# Patient Record
Sex: MALE | Race: WHITE
[De-identification: names, ages, dates, MRNs, and addresses within clinical notes are randomized per-mention and may not be internally consistent; named-entity substitution may affect disease eponyms.]

---

## 2024-05-30 ENCOUNTER — HOSPITAL ENCOUNTER (INPATIENT)
Dept: HOSPITAL 99 - 2 SOUTH | Age: 79
LOS: 3 days | Discharge: HOME | DRG: 853 | End: 2024-06-02
Payer: COMMERCIAL

## 2024-05-30 VITALS — SYSTOLIC BLOOD PRESSURE: 157 MMHG | DIASTOLIC BLOOD PRESSURE: 88 MMHG | RESPIRATION RATE: 14 BRPM

## 2024-05-30 VITALS — RESPIRATION RATE: 25 BRPM

## 2024-05-30 VITALS — RESPIRATION RATE: 34 BRPM

## 2024-05-30 VITALS — RESPIRATION RATE: 27 BRPM

## 2024-05-30 VITALS — RESPIRATION RATE: 19 BRPM

## 2024-05-30 VITALS — RESPIRATION RATE: 24 BRPM | DIASTOLIC BLOOD PRESSURE: 92 MMHG | SYSTOLIC BLOOD PRESSURE: 148 MMHG

## 2024-05-30 VITALS — RESPIRATION RATE: 23 BRPM

## 2024-05-30 VITALS — SYSTOLIC BLOOD PRESSURE: 104 MMHG | DIASTOLIC BLOOD PRESSURE: 62 MMHG | RESPIRATION RATE: 15 BRPM

## 2024-05-30 VITALS — RESPIRATION RATE: 18 BRPM | DIASTOLIC BLOOD PRESSURE: 107 MMHG | SYSTOLIC BLOOD PRESSURE: 171 MMHG

## 2024-05-30 VITALS — RESPIRATION RATE: 21 BRPM

## 2024-05-30 VITALS — SYSTOLIC BLOOD PRESSURE: 147 MMHG | DIASTOLIC BLOOD PRESSURE: 90 MMHG | RESPIRATION RATE: 24 BRPM

## 2024-05-30 VITALS — RESPIRATION RATE: 20 BRPM

## 2024-05-30 VITALS — RESPIRATION RATE: 23 BRPM | SYSTOLIC BLOOD PRESSURE: 125 MMHG | DIASTOLIC BLOOD PRESSURE: 71 MMHG

## 2024-05-30 VITALS — RESPIRATION RATE: 16 BRPM

## 2024-05-30 VITALS — RESPIRATION RATE: 22 BRPM

## 2024-05-30 VITALS — SYSTOLIC BLOOD PRESSURE: 113 MMHG | DIASTOLIC BLOOD PRESSURE: 77 MMHG | RESPIRATION RATE: 20 BRPM

## 2024-05-30 VITALS — RESPIRATION RATE: 28 BRPM

## 2024-05-30 VITALS — BODY MASS INDEX: 27.3 KG/M2 | BODY MASS INDEX: 25.8 KG/M2

## 2024-05-30 VITALS — RESPIRATION RATE: 13 BRPM

## 2024-05-30 DIAGNOSIS — Z95.818: ICD-10-CM

## 2024-05-30 DIAGNOSIS — Z86.73: ICD-10-CM

## 2024-05-30 DIAGNOSIS — I48.0: ICD-10-CM

## 2024-05-30 DIAGNOSIS — S09.90XA: ICD-10-CM

## 2024-05-30 DIAGNOSIS — Z66: ICD-10-CM

## 2024-05-30 DIAGNOSIS — Z96.652: ICD-10-CM

## 2024-05-30 DIAGNOSIS — K56.7: ICD-10-CM

## 2024-05-30 DIAGNOSIS — E87.1: ICD-10-CM

## 2024-05-30 DIAGNOSIS — E87.6: ICD-10-CM

## 2024-05-30 DIAGNOSIS — Z79.899: ICD-10-CM

## 2024-05-30 DIAGNOSIS — E86.0: ICD-10-CM

## 2024-05-30 DIAGNOSIS — A41.9: Primary | ICD-10-CM

## 2024-05-30 DIAGNOSIS — Z87.891: ICD-10-CM
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
## 2024-05-30 DIAGNOSIS — R74.01: ICD-10-CM
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
## 2024-05-30 DIAGNOSIS — R18.8: ICD-10-CM

## 2024-05-30 DIAGNOSIS — K80.00: ICD-10-CM

## 2024-05-30 DIAGNOSIS — E78.00: ICD-10-CM
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
## 2024-05-30 DIAGNOSIS — E78.5: ICD-10-CM

## 2024-05-30 DIAGNOSIS — I71.02: ICD-10-CM

## 2024-05-30 DIAGNOSIS — Z79.82: ICD-10-CM

## 2024-05-30 DIAGNOSIS — K59.00: ICD-10-CM

## 2024-05-30 DIAGNOSIS — I10: ICD-10-CM

## 2024-05-30 DIAGNOSIS — I73.9: ICD-10-CM

## 2024-05-30 DIAGNOSIS — D49.0: ICD-10-CM

## 2024-05-30 LAB
ALBUMIN SERPL-MCNC: 4.3 G/DL (ref 3.5–5)
ALP SERPL-CCNC: 164 U/L (ref 38–126)
ALT SERPL-CCNC: 23 U/L (ref 0–50)
AST SERPL-CCNC: 31 U/L (ref 17–59)
BUN SERPL-MCNC: 22 MG/DL (ref 9–20)
CALCIUM SERPL-MCNC: 9.8 MG/DL (ref 8.4–10.2)
CHLORIDE SERPL-SCNC: 94 MMOL/L (ref 98–107)
CO2 SERPL-SCNC: 25 MMOL/L (ref 22–30)
EGFR: > 60
ERYTHROCYTE [DISTWIDTH] IN BLOOD BY AUTOMATED COUNT: 15.5 % (ref 11.5–14.5)
ESTIMATED CREATININE CLEARANCE: 62 ML/MIN
GLUCOSE SERPL-MCNC: 103 MG/DL (ref 70–99)
HCT VFR BLD AUTO: 44.5 % (ref 39–52)
HGB BLD-MCNC: 15.2 G/DL (ref 13–18)
LIPASE SERPL-CCNC: 40 U/L (ref 23–300)
MCHC RBC AUTO-ENTMCNC: 34.2 G/DL (ref 33–37)
MCV RBC AUTO: 88.3 FL (ref 80–94)
NRBC BLD AUTO-RTO: 0 %
PLATELET # BLD AUTO: 240 10^3/UL (ref 130–400)
POTASSIUM SERPL-SCNC: 3.2 MMOL/L (ref 3.5–5.1)
PROT SERPL-MCNC: 7 G/DL (ref 6.3–8.2)
SODIUM SERPL-SCNC: 132 MMOL/L (ref 135–145)
TROPONIN I SERPL-MCNC: < 0.012 NG/ML

## 2024-05-30 PROCEDURE — 88304 TISSUE EXAM BY PATHOLOGIST: CPT

## 2024-05-30 PROCEDURE — A9575 INJ GADOTERATE MEGLUMI 0.1ML: HCPCS

## 2024-05-30 RX ADMIN — MAGNESIUM SULFATE HEPTAHYDRATE 50: 2 INJECTION, SOLUTION INTRAVENOUS at 19:31

## 2024-05-30 RX ADMIN — TAZOBACTAM SODIUM AND PIPERACILLIN SODIUM 50: 375; 3 INJECTION, SOLUTION INTRAVENOUS at 22:48

## 2024-05-30 RX ADMIN — SODIUM CHLORIDE 1000: 900 INJECTION, SOLUTION INTRAVENOUS at 19:30

## 2024-05-30 RX ADMIN — POTASSIUM CHLORIDE 40 MEQ: 1500 TABLET, EXTENDED RELEASE ORAL at 19:30

## 2024-05-31 VITALS — DIASTOLIC BLOOD PRESSURE: 98 MMHG | SYSTOLIC BLOOD PRESSURE: 143 MMHG | RESPIRATION RATE: 16 BRPM

## 2024-05-31 VITALS — RESPIRATION RATE: 16 BRPM | DIASTOLIC BLOOD PRESSURE: 84 MMHG | SYSTOLIC BLOOD PRESSURE: 145 MMHG

## 2024-05-31 VITALS
RESPIRATION RATE: 20 BRPM | OXYGEN SATURATION: 2 % | SYSTOLIC BLOOD PRESSURE: 164 MMHG | DIASTOLIC BLOOD PRESSURE: 98 MMHG

## 2024-05-31 VITALS — SYSTOLIC BLOOD PRESSURE: 159 MMHG | DIASTOLIC BLOOD PRESSURE: 106 MMHG | RESPIRATION RATE: 15 BRPM

## 2024-05-31 VITALS — RESPIRATION RATE: 18 BRPM | SYSTOLIC BLOOD PRESSURE: 145 MMHG | DIASTOLIC BLOOD PRESSURE: 94 MMHG

## 2024-05-31 VITALS — OXYGEN SATURATION: 2 %

## 2024-05-31 VITALS — RESPIRATION RATE: 16 BRPM | DIASTOLIC BLOOD PRESSURE: 80 MMHG | SYSTOLIC BLOOD PRESSURE: 130 MMHG

## 2024-05-31 VITALS — SYSTOLIC BLOOD PRESSURE: 131 MMHG | RESPIRATION RATE: 10 BRPM | DIASTOLIC BLOOD PRESSURE: 89 MMHG

## 2024-05-31 VITALS — RESPIRATION RATE: 16 BRPM

## 2024-05-31 VITALS — SYSTOLIC BLOOD PRESSURE: 136 MMHG | DIASTOLIC BLOOD PRESSURE: 99 MMHG | RESPIRATION RATE: 18 BRPM

## 2024-05-31 VITALS — SYSTOLIC BLOOD PRESSURE: 150 MMHG | DIASTOLIC BLOOD PRESSURE: 97 MMHG | RESPIRATION RATE: 20 BRPM

## 2024-05-31 VITALS — RESPIRATION RATE: 17 BRPM | SYSTOLIC BLOOD PRESSURE: 162 MMHG | DIASTOLIC BLOOD PRESSURE: 102 MMHG

## 2024-05-31 VITALS — SYSTOLIC BLOOD PRESSURE: 133 MMHG | RESPIRATION RATE: 18 BRPM | DIASTOLIC BLOOD PRESSURE: 80 MMHG

## 2024-05-31 VITALS — SYSTOLIC BLOOD PRESSURE: 35 MMHG | OXYGEN SATURATION: 2 %

## 2024-05-31 VITALS — DIASTOLIC BLOOD PRESSURE: 88 MMHG | RESPIRATION RATE: 16 BRPM | SYSTOLIC BLOOD PRESSURE: 144 MMHG

## 2024-05-31 VITALS — RESPIRATION RATE: 15 BRPM | SYSTOLIC BLOOD PRESSURE: 136 MMHG | DIASTOLIC BLOOD PRESSURE: 94 MMHG

## 2024-05-31 VITALS — RESPIRATION RATE: 16 BRPM | SYSTOLIC BLOOD PRESSURE: 139 MMHG | DIASTOLIC BLOOD PRESSURE: 85 MMHG

## 2024-05-31 VITALS — SYSTOLIC BLOOD PRESSURE: 141 MMHG | RESPIRATION RATE: 16 BRPM | DIASTOLIC BLOOD PRESSURE: 79 MMHG

## 2024-05-31 VITALS — RESPIRATION RATE: 19 BRPM

## 2024-05-31 VITALS — OXYGEN SATURATION: 2 % | DIASTOLIC BLOOD PRESSURE: 94 MMHG

## 2024-05-31 VITALS — SYSTOLIC BLOOD PRESSURE: 139 MMHG | DIASTOLIC BLOOD PRESSURE: 93 MMHG

## 2024-05-31 VITALS — DIASTOLIC BLOOD PRESSURE: 87 MMHG | RESPIRATION RATE: 16 BRPM | SYSTOLIC BLOOD PRESSURE: 123 MMHG

## 2024-05-31 LAB
ALBUMIN SERPL-MCNC: 3.4 G/DL (ref 3.5–5)
ALP SERPL-CCNC: 155 U/L (ref 38–126)
ALT SERPL-CCNC: 18 U/L (ref 0–50)
AST SERPL-CCNC: 27 U/L (ref 17–59)
BUN SERPL-MCNC: 22 MG/DL (ref 9–20)
CALCIUM SERPL-MCNC: 8.8 MG/DL (ref 8.4–10.2)
CHLORIDE SERPL-SCNC: 99 MMOL/L (ref 98–107)
CO2 SERPL-SCNC: 30 MMOL/L (ref 22–30)
EGFR: > 60
ERYTHROCYTE [DISTWIDTH] IN BLOOD BY AUTOMATED COUNT: 15.8 % (ref 11.5–14.5)
ESTIMATED CREATININE CLEARANCE: 62 ML/MIN
GLUCOSE SERPL-MCNC: 65 MG/DL (ref 70–99)
HCT VFR BLD AUTO: 40.1 % (ref 39–52)
HGB BLD-MCNC: 13.6 G/DL (ref 13–18)
MAGNESIUM SERPL-MCNC: 2.5 MG/DL (ref 1.6–2.3)
MCHC RBC AUTO-ENTMCNC: 33.9 G/DL (ref 33–37)
MCV RBC AUTO: 89.1 FL (ref 80–94)
PLATELET # BLD AUTO: 211 10^3/UL (ref 130–400)
POTASSIUM SERPL-SCNC: 3 MMOL/L (ref 3.5–5.1)
PROT SERPL-MCNC: 5.9 G/DL (ref 6.3–8.2)
SODIUM SERPL-SCNC: 136 MMOL/L (ref 135–145)
SQUAMOUS URNS QL MICRO: (no result) /LPF
URINE RED BLOOD CELL: (no result) /HPF (ref 0–2)
URINE WHITE CELL: (no result) /HPF (ref 0–5)

## 2024-05-31 PROCEDURE — BF502Z0 OTHER IMAGING OF BILE DUCTS USING FLUORESCING AGENT, INTRAOPERATIVE: ICD-10-PCS | Performed by: RADIOLOGY

## 2024-05-31 PROCEDURE — 0FT44ZZ RESECTION OF GALLBLADDER, PERCUTANEOUS ENDOSCOPIC APPROACH: ICD-10-PCS | Performed by: SURGERY

## 2024-05-31 RX ADMIN — ACETAMINOPHEN 650 MG: 325 TABLET ORAL at 01:24

## 2024-05-31 RX ADMIN — TAZOBACTAM SODIUM AND PIPERACILLIN SODIUM: 375; 3 INJECTION, SOLUTION INTRAVENOUS at 14:05

## 2024-05-31 RX ADMIN — HYDROMORPHONE HYDROCHLORIDE 0.5 MG: 1 INJECTION, SOLUTION INTRAMUSCULAR; INTRAVENOUS; SUBCUTANEOUS at 12:21

## 2024-05-31 RX ADMIN — METOPROLOL TARTRATE 50 MG: 50 TABLET, FILM COATED ORAL at 20:18

## 2024-05-31 RX ADMIN — TAZOBACTAM SODIUM AND PIPERACILLIN SODIUM 50: 375; 3 INJECTION, SOLUTION INTRAVENOUS at 15:57

## 2024-05-31 RX ADMIN — SODIUM BICARBONATE 270 MEQ: 84 INJECTION, SOLUTION INTRAVENOUS at 12:32

## 2024-05-31 RX ADMIN — ATORVASTATIN CALCIUM 80 MG: 80 TABLET, FILM COATED ORAL at 17:57

## 2024-05-31 RX ADMIN — TAZOBACTAM SODIUM AND PIPERACILLIN SODIUM 50: 375; 3 INJECTION, SOLUTION INTRAVENOUS at 04:22

## 2024-05-31 RX ADMIN — ASPIRIN 81 MG: 81 TABLET, COATED ORAL at 08:38

## 2024-05-31 RX ADMIN — SODIUM CHLORIDE 10 ML: 9 INJECTION, SOLUTION INTRAMUSCULAR; INTRAVENOUS; SUBCUTANEOUS at 14:10

## 2024-05-31 RX ADMIN — METOPROLOL TARTRATE 50 MG: 50 TABLET, FILM COATED ORAL at 08:38

## 2024-05-31 RX ADMIN — TAZOBACTAM SODIUM AND PIPERACILLIN SODIUM 50: 375; 3 INJECTION, SOLUTION INTRAVENOUS at 21:06

## 2024-05-31 RX ADMIN — PANTOPRAZOLE SODIUM 40 MG: 40 INJECTION, POWDER, LYOPHILIZED, FOR SOLUTION INTRAVENOUS at 14:10

## 2024-05-31 RX ADMIN — SODIUM CHLORIDE, SODIUM LACTATE, POTASSIUM CHLORIDE, AND CALCIUM CHLORIDE 1000: 600; 310; 30; 20 INJECTION, SOLUTION INTRAVENOUS at 01:22

## 2024-05-31 RX ADMIN — METOPROLOL TARTRATE 5 MG: 1 INJECTION, SOLUTION INTRAVENOUS at 12:33

## 2024-05-31 RX ADMIN — ENOXAPARIN SODIUM 40 MG: 40 INJECTION SUBCUTANEOUS at 17:57

## 2024-05-31 RX ADMIN — SODIUM CHLORIDE, SODIUM LACTATE, POTASSIUM CHLORIDE, AND CALCIUM CHLORIDE 1000: 600; 310; 30; 20 INJECTION, SOLUTION INTRAVENOUS at 13:11

## 2024-06-01 VITALS — SYSTOLIC BLOOD PRESSURE: 129 MMHG | RESPIRATION RATE: 18 BRPM | DIASTOLIC BLOOD PRESSURE: 86 MMHG

## 2024-06-01 VITALS — RESPIRATION RATE: 16 BRPM | SYSTOLIC BLOOD PRESSURE: 146 MMHG | DIASTOLIC BLOOD PRESSURE: 96 MMHG

## 2024-06-01 VITALS — DIASTOLIC BLOOD PRESSURE: 105 MMHG | RESPIRATION RATE: 16 BRPM | SYSTOLIC BLOOD PRESSURE: 145 MMHG

## 2024-06-01 VITALS — SYSTOLIC BLOOD PRESSURE: 149 MMHG | DIASTOLIC BLOOD PRESSURE: 96 MMHG | RESPIRATION RATE: 18 BRPM

## 2024-06-01 VITALS — SYSTOLIC BLOOD PRESSURE: 139 MMHG | DIASTOLIC BLOOD PRESSURE: 93 MMHG | RESPIRATION RATE: 18 BRPM

## 2024-06-01 VITALS — DIASTOLIC BLOOD PRESSURE: 95 MMHG | RESPIRATION RATE: 20 BRPM | SYSTOLIC BLOOD PRESSURE: 164 MMHG

## 2024-06-01 LAB
ALBUMIN SERPL-MCNC: 3.4 G/DL (ref 3.5–5)
ALP SERPL-CCNC: 298 U/L (ref 38–126)
ALT SERPL-CCNC: 70 U/L (ref 0–50)
AST SERPL-CCNC: 83 U/L (ref 17–59)
BUN SERPL-MCNC: 30 MG/DL (ref 9–20)
CALCIUM SERPL-MCNC: 9 MG/DL (ref 8.4–10.2)
CEA SERPL-MCNC: 0.6 NG/ML
CHLORIDE SERPL-SCNC: 100 MMOL/L (ref 98–107)
CO2 SERPL-SCNC: 27 MMOL/L (ref 22–30)
EGFR: > 60
ERYTHROCYTE [DISTWIDTH] IN BLOOD BY AUTOMATED COUNT: 15.9 % (ref 11.5–14.5)
ESTIMATED CREATININE CLEARANCE: 62 ML/MIN
GLUCOSE SERPL-MCNC: 121 MG/DL (ref 70–99)
HCT VFR BLD AUTO: 38.6 % (ref 39–52)
HGB BLD-MCNC: 13 G/DL (ref 13–18)
MCHC RBC AUTO-ENTMCNC: 33.7 G/DL (ref 33–37)
MCV RBC AUTO: 89.6 FL (ref 80–94)
PLATELET # BLD AUTO: 240 10^3/UL (ref 130–400)
POTASSIUM SERPL-SCNC: 3.7 MMOL/L (ref 3.5–5.1)
PROT SERPL-MCNC: 5.8 G/DL (ref 6.3–8.2)
SODIUM SERPL-SCNC: 136 MMOL/L (ref 135–145)

## 2024-06-01 RX ADMIN — METOPROLOL TARTRATE 50 MG: 50 TABLET, FILM COATED ORAL at 08:07

## 2024-06-01 RX ADMIN — TAZOBACTAM SODIUM AND PIPERACILLIN SODIUM 50: 375; 3 INJECTION, SOLUTION INTRAVENOUS at 21:24

## 2024-06-01 RX ADMIN — MINERAL OIL 133 ML: 100 ENEMA RECTAL at 15:43

## 2024-06-01 RX ADMIN — LISINOPRIL 20 MG: 20 TABLET ORAL at 20:26

## 2024-06-01 RX ADMIN — POLYETHYLENE GLYCOL 3350 17 GRAMS: 17 POWDER, FOR SOLUTION ORAL at 08:07

## 2024-06-01 RX ADMIN — ENOXAPARIN SODIUM 40 MG: 40 INJECTION SUBCUTANEOUS at 17:44

## 2024-06-01 RX ADMIN — TAZOBACTAM SODIUM AND PIPERACILLIN SODIUM 50: 375; 3 INJECTION, SOLUTION INTRAVENOUS at 17:44

## 2024-06-01 RX ADMIN — PANTOPRAZOLE SODIUM 40 MG: 40 INJECTION, POWDER, LYOPHILIZED, FOR SOLUTION INTRAVENOUS at 08:07

## 2024-06-01 RX ADMIN — LISINOPRIL 20 MG: 20 TABLET ORAL at 10:40

## 2024-06-01 RX ADMIN — TAZOBACTAM SODIUM AND PIPERACILLIN SODIUM 50: 375; 3 INJECTION, SOLUTION INTRAVENOUS at 10:40

## 2024-06-01 RX ADMIN — SENNOSIDES AND DOCUSATE SODIUM 1 TABLET: 8.6; 5 TABLET ORAL at 06:21

## 2024-06-01 RX ADMIN — TAZOBACTAM SODIUM AND PIPERACILLIN SODIUM 50: 375; 3 INJECTION, SOLUTION INTRAVENOUS at 03:38

## 2024-06-01 RX ADMIN — SODIUM CHLORIDE 10 ML: 9 INJECTION, SOLUTION INTRAMUSCULAR; INTRAVENOUS; SUBCUTANEOUS at 08:07

## 2024-06-01 RX ADMIN — METOPROLOL TARTRATE 50 MG: 50 TABLET, FILM COATED ORAL at 20:26

## 2024-06-01 RX ADMIN — ASPIRIN 81 MG: 81 TABLET, COATED ORAL at 08:07

## 2024-06-01 RX ADMIN — Medication 5 MG: at 22:42

## 2024-06-01 RX ADMIN — Medication 3 MG: at 01:56

## 2024-06-02 VITALS — DIASTOLIC BLOOD PRESSURE: 97 MMHG | SYSTOLIC BLOOD PRESSURE: 146 MMHG

## 2024-06-02 VITALS — SYSTOLIC BLOOD PRESSURE: 143 MMHG | RESPIRATION RATE: 16 BRPM | DIASTOLIC BLOOD PRESSURE: 93 MMHG

## 2024-06-02 VITALS — SYSTOLIC BLOOD PRESSURE: 144 MMHG | RESPIRATION RATE: 16 BRPM | DIASTOLIC BLOOD PRESSURE: 96 MMHG

## 2024-06-02 VITALS — RESPIRATION RATE: 16 BRPM | SYSTOLIC BLOOD PRESSURE: 144 MMHG | DIASTOLIC BLOOD PRESSURE: 96 MMHG

## 2024-06-02 VITALS — SYSTOLIC BLOOD PRESSURE: 132 MMHG | RESPIRATION RATE: 16 BRPM | DIASTOLIC BLOOD PRESSURE: 83 MMHG

## 2024-06-02 VITALS — RESPIRATION RATE: 14 BRPM | DIASTOLIC BLOOD PRESSURE: 89 MMHG | SYSTOLIC BLOOD PRESSURE: 137 MMHG

## 2024-06-02 LAB
ALBUMIN SERPL-MCNC: 3.2 G/DL (ref 3.5–5)
ALP SERPL-CCNC: 259 U/L (ref 38–126)
ALT SERPL-CCNC: 63 U/L (ref 0–50)
AST SERPL-CCNC: 68 U/L (ref 17–59)
BUN SERPL-MCNC: 31 MG/DL (ref 9–20)
CALCIUM SERPL-MCNC: 9 MG/DL (ref 8.4–10.2)
CANCER AG19-9 SERPL-ACNC: <2 U/ML (ref ?–35)
CHLORIDE SERPL-SCNC: 102 MMOL/L (ref 98–107)
CO2 SERPL-SCNC: 26 MMOL/L (ref 22–30)
EGFR: > 60
ESTIMATED CREATININE CLEARANCE: 69 ML/MIN
GLUCOSE SERPL-MCNC: 88 MG/DL (ref 70–99)
MAGNESIUM SERPL-MCNC: 2.4 MG/DL (ref 1.6–2.3)
POTASSIUM SERPL-SCNC: 3.2 MMOL/L (ref 3.5–5.1)
PROT SERPL-MCNC: 5.6 G/DL (ref 6.3–8.2)
SODIUM SERPL-SCNC: 137 MMOL/L (ref 135–145)

## 2024-06-02 RX ADMIN — MAGNESIUM HYDROXIDE 30 ML: 1200 LIQUID ORAL at 09:49

## 2024-06-02 RX ADMIN — POLYETHYLENE GLYCOL 3350 17 GRAMS: 17 POWDER, FOR SOLUTION ORAL at 08:35

## 2024-06-02 RX ADMIN — POTASSIUM CHLORIDE 40 MEQ: 1500 TABLET, EXTENDED RELEASE ORAL at 12:45

## 2024-06-02 RX ADMIN — SODIUM CHLORIDE 10 ML: 9 INJECTION, SOLUTION INTRAMUSCULAR; INTRAVENOUS; SUBCUTANEOUS at 08:35

## 2024-06-02 RX ADMIN — ASPIRIN 81 MG: 81 TABLET, COATED ORAL at 08:35

## 2024-06-02 RX ADMIN — POLYETHYLENE GLYCOL 3350 17 GRAMS: 17 POWDER, FOR SOLUTION ORAL at 09:49

## 2024-06-02 RX ADMIN — LISINOPRIL 20 MG: 20 TABLET ORAL at 08:34

## 2024-06-02 RX ADMIN — METOPROLOL TARTRATE 50 MG: 50 TABLET, FILM COATED ORAL at 08:34

## 2024-06-02 RX ADMIN — TAZOBACTAM SODIUM AND PIPERACILLIN SODIUM 50: 375; 3 INJECTION, SOLUTION INTRAVENOUS at 03:55

## 2024-06-02 RX ADMIN — TAZOBACTAM SODIUM AND PIPERACILLIN SODIUM 50: 375; 3 INJECTION, SOLUTION INTRAVENOUS at 09:48

## 2024-06-02 RX ADMIN — PANTOPRAZOLE SODIUM 40 MG: 40 INJECTION, POWDER, LYOPHILIZED, FOR SOLUTION INTRAVENOUS at 08:34

## 2024-06-02 RX ADMIN — BISACODYL 10 MG: 10 SUPPOSITORY RECTAL at 09:50

## 2024-06-07 ENCOUNTER — TELEPHONE (OUTPATIENT)
Age: 79
End: 2024-06-07

## 2024-06-10 ENCOUNTER — TELEPHONE (OUTPATIENT)
Dept: GASTROENTEROLOGY | Facility: MEDICAL CENTER | Age: 79
End: 2024-06-10

## 2024-06-10 NOTE — TELEPHONE ENCOUNTER
Left voicemail and requested call back     Called Patient to schedule new patient appointment with , but his scheduling goes far til Janaury 2025, if you would like to see a PA please let us know thank you        Message  Received: 3 days ago  [facility]  Caller: Unspecified (3 days ago,  4:09 PM)         Previous Messages    Returning call  (Newest Message First)  View All Conversations on this Encounter  Angie Waller routed conversation to [facility]3 days ago     Jahaira Brooks routed conversation to [facility]3 days ago     Jahaira Brooks3 days ago     [place]  Patients [facility] provider:  Dr. Liu     Number to return call: [phone]     Reason for call: Pt wife, Jessenia calling to let Dr. Liu know that they are able to get transportation to his office for appt/procedure. Pt wife would like a call back to set up a date and time and you may leave a message.     Scheduled procedure/appointment date if applicable: N/A            Note

## 2024-07-01 ENCOUNTER — HOSPITAL ENCOUNTER (EMERGENCY)
Dept: HOSPITAL 99 - EMR | Age: 79
Discharge: HOME | End: 2024-07-01
Payer: COMMERCIAL

## 2024-07-01 VITALS — SYSTOLIC BLOOD PRESSURE: 171 MMHG | RESPIRATION RATE: 16 BRPM | DIASTOLIC BLOOD PRESSURE: 108 MMHG

## 2024-07-01 VITALS — DIASTOLIC BLOOD PRESSURE: 117 MMHG | SYSTOLIC BLOOD PRESSURE: 178 MMHG

## 2024-07-01 VITALS — DIASTOLIC BLOOD PRESSURE: 116 MMHG | SYSTOLIC BLOOD PRESSURE: 177 MMHG

## 2024-07-01 VITALS — SYSTOLIC BLOOD PRESSURE: 172 MMHG | RESPIRATION RATE: 18 BRPM | DIASTOLIC BLOOD PRESSURE: 107 MMHG

## 2024-07-01 VITALS — SYSTOLIC BLOOD PRESSURE: 196 MMHG | DIASTOLIC BLOOD PRESSURE: 108 MMHG

## 2024-07-01 VITALS — BODY MASS INDEX: 27.7 KG/M2

## 2024-07-01 VITALS — RESPIRATION RATE: 18 BRPM

## 2024-07-01 DIAGNOSIS — I10: ICD-10-CM

## 2024-07-01 DIAGNOSIS — Z96.652: ICD-10-CM

## 2024-07-01 DIAGNOSIS — Z87.891: ICD-10-CM

## 2024-07-01 DIAGNOSIS — R30.0: Primary | ICD-10-CM

## 2024-07-01 DIAGNOSIS — R32: ICD-10-CM

## 2024-07-01 DIAGNOSIS — Z86.73: ICD-10-CM

## 2024-07-01 DIAGNOSIS — I48.91: ICD-10-CM

## 2024-07-01 DIAGNOSIS — R50.9: ICD-10-CM

## 2024-07-01 DIAGNOSIS — R35.0: ICD-10-CM

## 2024-07-01 DIAGNOSIS — E78.5: ICD-10-CM

## 2024-07-01 DIAGNOSIS — C80.1: ICD-10-CM

## 2024-07-01 DIAGNOSIS — Z90.49: ICD-10-CM

## 2024-07-01 DIAGNOSIS — Z98.890: ICD-10-CM

## 2024-07-01 LAB
ALBUMIN SERPL-MCNC: 4 G/DL (ref 3.5–5)
ALP SERPL-CCNC: 228 U/L (ref 38–126)
ALT SERPL-CCNC: 23 U/L (ref 0–50)
AST SERPL-CCNC: 34 U/L (ref 17–59)
BUN SERPL-MCNC: 15 MG/DL (ref 9–20)
CALCIUM SERPL-MCNC: 9.8 MG/DL (ref 8.4–10.2)
CHLORIDE SERPL-SCNC: 100 MMOL/L (ref 98–107)
CO2 SERPL-SCNC: 30 MMOL/L (ref 22–30)
EGFR: > 60
ERYTHROCYTE [DISTWIDTH] IN BLOOD BY AUTOMATED COUNT: 15.1 % (ref 11.5–14.5)
ESTIMATED CREATININE CLEARANCE: 77 ML/MIN
GLUCOSE SERPL-MCNC: 93 MG/DL (ref 70–99)
HCT VFR BLD AUTO: 37.9 % (ref 39–52)
HGB BLD-MCNC: 13.3 G/DL (ref 13–18)
MCHC RBC AUTO-ENTMCNC: 35.1 G/DL (ref 33–37)
MCV RBC AUTO: 86.7 FL (ref 80–94)
NRBC BLD AUTO-RTO: 0 %
PLATELET # BLD AUTO: 189 10^3/UL (ref 130–400)
POTASSIUM SERPL-SCNC: 3.2 MMOL/L (ref 3.5–5.1)
PROT SERPL-MCNC: 6.4 G/DL (ref 6.3–8.2)
SODIUM SERPL-SCNC: 139 MMOL/L (ref 135–145)
URINE RED BLOOD CELL: (no result) /HPF (ref 0–2)
URINE WHITE CELL: >100 /HPF (ref 0–5)

## 2024-07-01 PROCEDURE — 99284 EMERGENCY DEPT VISIT MOD MDM: CPT

## 2024-07-01 PROCEDURE — 51798 US URINE CAPACITY MEASURE: CPT

## 2024-07-01 PROCEDURE — 96374 THER/PROPH/DIAG INJ IV PUSH: CPT

## 2024-07-01 RX ADMIN — SODIUM CHLORIDE 1000: 900 INJECTION, SOLUTION INTRAVENOUS at 11:10

## 2024-07-01 RX ADMIN — ACETAMINOPHEN 1000 MG: 500 TABLET ORAL at 11:10

## 2024-07-01 RX ADMIN — CEFTRIAXONE 1000 MG: 1 INJECTION, POWDER, FOR SOLUTION INTRAMUSCULAR; INTRAVENOUS at 12:40

## 2024-07-01 RX ADMIN — POTASSIUM CHLORIDE 40 MEQ: 1500 TABLET, EXTENDED RELEASE ORAL at 12:41

## 2024-07-05 ENCOUNTER — HOSPITAL ENCOUNTER (OUTPATIENT)
Dept: HOSPITAL 99 - RAD | Age: 79
End: 2024-07-05
Payer: COMMERCIAL

## 2024-07-05 DIAGNOSIS — I73.9: Primary | ICD-10-CM

## 2025-06-02 ENCOUNTER — HOSPITAL ENCOUNTER (EMERGENCY)
Dept: HOSPITAL 99 - EMR | Age: 80
Discharge: HOME | End: 2025-06-02
Payer: COMMERCIAL

## 2025-06-02 VITALS — DIASTOLIC BLOOD PRESSURE: 86 MMHG | SYSTOLIC BLOOD PRESSURE: 144 MMHG

## 2025-06-02 VITALS — BODY MASS INDEX: 28 KG/M2

## 2025-06-02 VITALS — SYSTOLIC BLOOD PRESSURE: 147 MMHG | DIASTOLIC BLOOD PRESSURE: 86 MMHG

## 2025-06-02 VITALS — DIASTOLIC BLOOD PRESSURE: 82 MMHG | SYSTOLIC BLOOD PRESSURE: 118 MMHG

## 2025-06-02 VITALS — SYSTOLIC BLOOD PRESSURE: 135 MMHG | DIASTOLIC BLOOD PRESSURE: 93 MMHG

## 2025-06-02 VITALS — DIASTOLIC BLOOD PRESSURE: 92 MMHG | SYSTOLIC BLOOD PRESSURE: 131 MMHG

## 2025-06-02 VITALS — DIASTOLIC BLOOD PRESSURE: 93 MMHG | SYSTOLIC BLOOD PRESSURE: 146 MMHG

## 2025-06-02 DIAGNOSIS — R50.9: ICD-10-CM

## 2025-06-02 DIAGNOSIS — J44.9: ICD-10-CM

## 2025-06-02 DIAGNOSIS — R53.1: ICD-10-CM

## 2025-06-02 DIAGNOSIS — I48.91: ICD-10-CM

## 2025-06-02 DIAGNOSIS — I89.0: ICD-10-CM

## 2025-06-02 DIAGNOSIS — R05.9: Primary | ICD-10-CM

## 2025-06-02 DIAGNOSIS — Z90.49: ICD-10-CM

## 2025-06-02 DIAGNOSIS — I10: ICD-10-CM

## 2025-06-02 LAB
ALBUMIN SERPL-MCNC: 4.8 G/DL (ref 3.5–5)
ALP SERPL-CCNC: 148 U/L (ref 38–126)
ALT SERPL-CCNC: 24 U/L (ref 0–50)
AST SERPL-CCNC: 26 U/L (ref 17–59)
BASOPHILS # BLD AUTO: 0 10^3/UL (ref 0–0.2)
BASOPHILS NFR BLD AUTO: 0.4 % (ref 0–2)
BILIRUB SERPL-MCNC: 2.7 MG/DL (ref 0.2–1.3)
BUN SERPL-MCNC: 23 MG/DL (ref 9–20)
CALCIUM SERPL-MCNC: 9.5 MG/DL (ref 8.4–10.2)
CHLORIDE SERPL-SCNC: 109 MMOL/L (ref 98–107)
CO2 SERPL-SCNC: 22 MMOL/L (ref 22–30)
COMMENT: (no result)
CREAT SERPL-MCNC: 1 MG/DL (ref 0.7–1.3)
EGFR: > 60
EOSINOPHIL # BLD AUTO: 0 10^3/UL (ref 0–0.7)
EOSINOPHIL NFR BLD AUTO: 0.2 % (ref 0–6)
ERYTHROCYTE [DISTWIDTH] IN BLOOD BY AUTOMATED COUNT: 14 % (ref 11.5–14.5)
ESTIMATED CREATININE CLEARANCE: 61 ML/MIN
GLUCOSE SERPL-MCNC: 96 MG/DL (ref 70–99)
HCT VFR BLD AUTO: 35 % (ref 39–52)
HGB BLD-MCNC: 12.1 G/DL (ref 13–18)
IMM GRANULOCYTES # BLD AUTO: 0 10^3/UL (ref 0–0.05)
IMM GRANULOCYTES NFR BLD AUTO: 0.3 % (ref 0–0.5)
LYMPHOCYTES # BLD AUTO: 0.5 10^3/UL (ref 1.2–3.4)
LYMPHOCYTES NFR BLD AUTO: 5.3 % (ref 20.5–51.1)
MCH RBC QN AUTO: 33.2 PG (ref 27–31)
MCHC RBC AUTO-ENTMCNC: 34.6 G/DL (ref 33–37)
MCV RBC AUTO: 96.2 FL (ref 80–94)
MONOCYTES # BLD AUTO: 0.9 10^3/UL (ref 0.1–0.6)
MONOCYTES NFR BLD AUTO: 9.4 % (ref 1.7–9.3)
NEUTROPHILS # BLD AUTO: 7.7 10^3/UL (ref 1.4–6.5)
NEUTROPHILS NFR BLD AUTO: 84.4 % (ref 42.2–75.2)
NRBC BLD AUTO-RTO: 0 %
PLATELET # BLD AUTO: 168 10^3/UL (ref 130–400)
PMV BLD AUTO: 10.3 FL (ref 7.4–10.4)
POTASSIUM SERPL-SCNC: 4.5 MMOL/L (ref 3.5–5.1)
PROT SERPL-MCNC: 7 G/DL (ref 6.3–8.2)
RBC # BLD AUTO: 3.64 10^6/UL (ref 4.7–6.1)
SARS-COV+SARS-COV-2 AG RESP QL IA.RAPID: POSITIVE
SODIUM SERPL-SCNC: 139 MMOL/L (ref 135–145)
WBC # BLD AUTO: 9.1 10^3/UL (ref 4.8–10.8)

## 2025-06-02 PROCEDURE — 96374 THER/PROPH/DIAG INJ IV PUSH: CPT

## 2025-06-02 PROCEDURE — 99283 EMERGENCY DEPT VISIT LOW MDM: CPT

## 2025-06-02 PROCEDURE — 94640 AIRWAY INHALATION TREATMENT: CPT

## 2025-06-02 RX ADMIN — ACETAMINOPHEN 650 MG: 325 TABLET ORAL at 22:18

## 2025-06-02 RX ADMIN — SODIUM CHLORIDE 1000: 900 INJECTION, SOLUTION INTRAVENOUS at 20:02

## 2025-06-02 RX ADMIN — IPRATROPIUM BROMIDE AND ALBUTEROL SULFATE 3 ML: 2.5; .5 SOLUTION RESPIRATORY (INHALATION) at 22:17

## 2025-06-02 RX ADMIN — DEXAMETHASONE SODIUM PHOSPHATE 10 MG: 4 INJECTION, SOLUTION INTRA-ARTICULAR; INTRALESIONAL; INTRAMUSCULAR; INTRAVENOUS; SOFT TISSUE at 22:17
